# Patient Record
Sex: MALE | Race: WHITE | NOT HISPANIC OR LATINO | URBAN - METROPOLITAN AREA
[De-identification: names, ages, dates, MRNs, and addresses within clinical notes are randomized per-mention and may not be internally consistent; named-entity substitution may affect disease eponyms.]

---

## 2019-06-03 ENCOUNTER — EMERGENCY (EMERGENCY)
Facility: HOSPITAL | Age: 64
LOS: 0 days | Discharge: HOME | End: 2019-06-03
Attending: STUDENT IN AN ORGANIZED HEALTH CARE EDUCATION/TRAINING PROGRAM | Admitting: STUDENT IN AN ORGANIZED HEALTH CARE EDUCATION/TRAINING PROGRAM
Payer: COMMERCIAL

## 2019-06-03 VITALS — HEIGHT: 71 IN | WEIGHT: 205.03 LBS

## 2019-06-03 VITALS
TEMPERATURE: 99 F | HEART RATE: 69 BPM | RESPIRATION RATE: 17 BRPM | OXYGEN SATURATION: 98 % | DIASTOLIC BLOOD PRESSURE: 84 MMHG | SYSTOLIC BLOOD PRESSURE: 124 MMHG

## 2019-06-03 DIAGNOSIS — Z79.899 OTHER LONG TERM (CURRENT) DRUG THERAPY: ICD-10-CM

## 2019-06-03 DIAGNOSIS — Z88.0 ALLERGY STATUS TO PENICILLIN: ICD-10-CM

## 2019-06-03 DIAGNOSIS — I10 ESSENTIAL (PRIMARY) HYPERTENSION: ICD-10-CM

## 2019-06-03 DIAGNOSIS — K52.9 NONINFECTIVE GASTROENTERITIS AND COLITIS, UNSPECIFIED: ICD-10-CM

## 2019-06-03 DIAGNOSIS — Z79.2 LONG TERM (CURRENT) USE OF ANTIBIOTICS: ICD-10-CM

## 2019-06-03 DIAGNOSIS — R11.10 VOMITING, UNSPECIFIED: ICD-10-CM

## 2019-06-03 LAB
ALBUMIN SERPL ELPH-MCNC: 4.1 G/DL — SIGNIFICANT CHANGE UP (ref 3.3–5)
ALP SERPL-CCNC: 42 U/L — SIGNIFICANT CHANGE UP (ref 40–120)
ALT FLD-CCNC: 50 U/L — SIGNIFICANT CHANGE UP (ref 12–78)
ANION GAP SERPL CALC-SCNC: 9 MMOL/L — SIGNIFICANT CHANGE UP (ref 5–17)
AST SERPL-CCNC: 32 U/L — SIGNIFICANT CHANGE UP (ref 15–37)
BASOPHILS # BLD AUTO: 0.05 K/UL — SIGNIFICANT CHANGE UP (ref 0–0.2)
BASOPHILS NFR BLD AUTO: 0.6 % — SIGNIFICANT CHANGE UP (ref 0–2)
BILIRUB SERPL-MCNC: 0.8 MG/DL — SIGNIFICANT CHANGE UP (ref 0.2–1.2)
BUN SERPL-MCNC: 21 MG/DL — SIGNIFICANT CHANGE UP (ref 7–23)
CALCIUM SERPL-MCNC: 9.5 MG/DL — SIGNIFICANT CHANGE UP (ref 8.5–10.1)
CHLORIDE SERPL-SCNC: 104 MMOL/L — SIGNIFICANT CHANGE UP (ref 96–108)
CO2 SERPL-SCNC: 30 MMOL/L — SIGNIFICANT CHANGE UP (ref 22–31)
CREAT SERPL-MCNC: 0.88 MG/DL — SIGNIFICANT CHANGE UP (ref 0.5–1.3)
EOSINOPHIL # BLD AUTO: 0.02 K/UL — SIGNIFICANT CHANGE UP (ref 0–0.5)
EOSINOPHIL NFR BLD AUTO: 0.2 % — SIGNIFICANT CHANGE UP (ref 0–6)
GLUCOSE SERPL-MCNC: 123 MG/DL — HIGH (ref 70–99)
HCT VFR BLD CALC: 42.6 % — SIGNIFICANT CHANGE UP (ref 39–50)
HGB BLD-MCNC: 15.6 G/DL — SIGNIFICANT CHANGE UP (ref 13–17)
IMM GRANULOCYTES NFR BLD AUTO: 0.8 % — SIGNIFICANT CHANGE UP (ref 0–1.5)
LIDOCAIN IGE QN: 112 U/L — SIGNIFICANT CHANGE UP (ref 73–393)
LYMPHOCYTES # BLD AUTO: 0.45 K/UL — LOW (ref 1–3.3)
LYMPHOCYTES # BLD AUTO: 5.3 % — LOW (ref 13–44)
MAGNESIUM SERPL-MCNC: 1.8 MG/DL — SIGNIFICANT CHANGE UP (ref 1.6–2.6)
MANUAL SMEAR VERIFICATION: SIGNIFICANT CHANGE UP
MCHC RBC-ENTMCNC: 35.6 PG — HIGH (ref 27–34)
MCHC RBC-ENTMCNC: 36.6 GM/DL — HIGH (ref 32–36)
MCV RBC AUTO: 97.3 FL — SIGNIFICANT CHANGE UP (ref 80–100)
MONOCYTES # BLD AUTO: 0.49 K/UL — SIGNIFICANT CHANGE UP (ref 0–0.9)
MONOCYTES NFR BLD AUTO: 5.8 % — SIGNIFICANT CHANGE UP (ref 2–14)
NEUTROPHILS # BLD AUTO: 7.39 K/UL — SIGNIFICANT CHANGE UP (ref 1.8–7.4)
NEUTROPHILS NFR BLD AUTO: 87.3 % — HIGH (ref 43–77)
PLAT MORPH BLD: NORMAL — SIGNIFICANT CHANGE UP
PLATELET # BLD AUTO: 169 K/UL — SIGNIFICANT CHANGE UP (ref 150–400)
POTASSIUM SERPL-MCNC: 3.3 MMOL/L — LOW (ref 3.5–5.3)
POTASSIUM SERPL-SCNC: 3.3 MMOL/L — LOW (ref 3.5–5.3)
PROT SERPL-MCNC: 7.6 GM/DL — SIGNIFICANT CHANGE UP (ref 6–8.3)
RBC # BLD: 4.38 M/UL — SIGNIFICANT CHANGE UP (ref 4.2–5.8)
RBC # FLD: 12.6 % — SIGNIFICANT CHANGE UP (ref 10.3–14.5)
RBC BLD AUTO: NORMAL — SIGNIFICANT CHANGE UP
SODIUM SERPL-SCNC: 143 MMOL/L — SIGNIFICANT CHANGE UP (ref 135–145)
WBC # BLD: 8.47 K/UL — SIGNIFICANT CHANGE UP (ref 3.8–10.5)
WBC # FLD AUTO: 8.47 K/UL — SIGNIFICANT CHANGE UP (ref 3.8–10.5)

## 2019-06-03 PROCEDURE — 74177 CT ABD & PELVIS W/CONTRAST: CPT | Mod: 26

## 2019-06-03 PROCEDURE — 93010 ELECTROCARDIOGRAM REPORT: CPT

## 2019-06-03 PROCEDURE — 99285 EMERGENCY DEPT VISIT HI MDM: CPT

## 2019-06-03 PROCEDURE — 74176 CT ABD & PELVIS W/O CONTRAST: CPT | Mod: 26,59

## 2019-06-03 RX ORDER — POTASSIUM CHLORIDE 20 MEQ
10 PACKET (EA) ORAL
Refills: 0 | Status: COMPLETED | OUTPATIENT
Start: 2019-06-03 | End: 2019-06-03

## 2019-06-03 RX ORDER — SODIUM CHLORIDE 9 MG/ML
1000 INJECTION INTRAMUSCULAR; INTRAVENOUS; SUBCUTANEOUS ONCE
Refills: 0 | Status: COMPLETED | OUTPATIENT
Start: 2019-06-03 | End: 2019-06-03

## 2019-06-03 RX ORDER — FAMOTIDINE 10 MG/ML
20 INJECTION INTRAVENOUS ONCE
Refills: 0 | Status: COMPLETED | OUTPATIENT
Start: 2019-06-03 | End: 2019-06-03

## 2019-06-03 RX ORDER — POTASSIUM CHLORIDE 20 MEQ
40 PACKET (EA) ORAL ONCE
Refills: 0 | Status: DISCONTINUED | OUTPATIENT
Start: 2019-06-03 | End: 2019-06-03

## 2019-06-03 RX ORDER — ONDANSETRON 8 MG/1
4 TABLET, FILM COATED ORAL ONCE
Refills: 0 | Status: COMPLETED | OUTPATIENT
Start: 2019-06-03 | End: 2019-06-03

## 2019-06-03 RX ADMIN — SODIUM CHLORIDE 1000 MILLILITER(S): 9 INJECTION INTRAMUSCULAR; INTRAVENOUS; SUBCUTANEOUS at 09:51

## 2019-06-03 RX ADMIN — SODIUM CHLORIDE 1000 MILLILITER(S): 9 INJECTION INTRAMUSCULAR; INTRAVENOUS; SUBCUTANEOUS at 13:11

## 2019-06-03 RX ADMIN — FAMOTIDINE 20 MILLIGRAM(S): 10 INJECTION INTRAVENOUS at 10:38

## 2019-06-03 RX ADMIN — Medication 100 MILLIEQUIVALENT(S): at 14:59

## 2019-06-03 RX ADMIN — ONDANSETRON 4 MILLIGRAM(S): 8 TABLET, FILM COATED ORAL at 09:52

## 2019-06-03 RX ADMIN — SODIUM CHLORIDE 1000 MILLILITER(S): 9 INJECTION INTRAMUSCULAR; INTRAVENOUS; SUBCUTANEOUS at 10:51

## 2019-06-03 RX ADMIN — SODIUM CHLORIDE 1000 MILLILITER(S): 9 INJECTION INTRAMUSCULAR; INTRAVENOUS; SUBCUTANEOUS at 14:11

## 2019-06-03 RX ADMIN — Medication 100 MILLIEQUIVALENT(S): at 16:44

## 2019-06-03 RX ADMIN — Medication 10 MILLIEQUIVALENT(S): at 14:09

## 2019-06-03 RX ADMIN — ONDANSETRON 4 MILLIGRAM(S): 8 TABLET, FILM COATED ORAL at 11:02

## 2019-06-03 RX ADMIN — Medication 20 MILLIGRAM(S): at 10:43

## 2019-06-03 RX ADMIN — Medication 10 MILLIEQUIVALENT(S): at 15:59

## 2019-06-03 RX ADMIN — Medication 100 MILLIEQUIVALENT(S): at 13:09

## 2019-06-03 RX ADMIN — Medication 10 MILLIEQUIVALENT(S): at 17:45

## 2019-06-03 NOTE — CONSULT NOTE ADULT - ASSESSMENT
64 y/o M presents with nausea and vomiting with possible SBO.     reviewed imaging and physical exam much improved since admission   recommends repeat CT scan with PO contrast   If improved DC home with follow up     Thank you for the consult     Discussed with Dr Ackerman

## 2019-06-03 NOTE — ED PROVIDER NOTE - OBJECTIVE STATEMENT
64 y/o male with a PMHx of appendectomy, 2 hernias presents c/o vomiting and dizziness. Pt states he ate out last night and he awoke today at 4:35AM vomiting.Pt reports about 10 episodes of vomiting.Pt notes the abdominal pain came first and then vomiting. Pt reports tingling in the chest and CP.Deies blood in vomit and back pain  amoxycillin and BP medication  Pt states pain in abdomen has been resolved.   Pt has had gingerale.   normal 62 y/o male with a PMHx of appendectomy, 2 hernias presents c/o vomiting and dizziness. Pt states he ate out last night and he awoke today at 4:35AM vomiting. Pt reports about 10 episodes of vomiting. Pt notes the abdominal pain came first in waxing episodes and then the vomiting. Pt reports tingling in the chest and CP. Denies blood in vomit and back pain. Pt drank gingerale and states he has no abdominal pain at this time. Pt is currently taking amoxicillin and BP medication. 62 y/o male with a PMHx of appendectomy, 2 hernias presents c/o vomiting and dizziness. Pt states he ate out last night and he awoke today at 4:35AM vomiting. Pt reports about 10 episodes of vomiting. Pt notes the abdominal pain came first in waxing episodes and then the vomiting. Denies blood in vomit and back pain. Pt drank gingerale and states he has no abdominal pain at this time. Pt is currently taking amoxicillin and BP medication.

## 2019-06-03 NOTE — ED ADULT TRIAGE NOTE - CHIEF COMPLAINT QUOTE
pt presents to ED c/o dizziness/nausea. pt states he ate out last night and believes he may have food poisoning, awoke today at 5 AM vomiting

## 2019-06-03 NOTE — ED PROVIDER NOTE - PROGRESS NOTE DETAILS
Any Nash for ED Attending Dr. Marinelli: Spoke with Dr. Null (pt surgeon) reports that he is not available today requests Dr. Ryder surgery on call be call spoken to. Surgery resident will come to ED to see pt. Any Nash for ED Attending Dr. Marinelli: Spoke with Dr. Null (pt surgeon) reports that he is not available today requests Dr. Zhong surgery on call be contacted; Surgery resident will come to ED to see pt. Isis DO: Per surgery team- would like patient re-scanned with PO contrast- test ordered as requested; patient made aware. Isis DO: S/o to Dr. Slater pending repeat CT scan at this time.

## 2019-06-03 NOTE — ED PROVIDER NOTE - CLINICAL SUMMARY MEDICAL DECISION MAKING FREE TEXT BOX
64 y/o male presents to ED c/o vomiting. Pt has no abdominal pain at this time. Plan is labs, fluids, and re-evaluate.

## 2022-09-23 NOTE — ED ADULT NURSE NOTE - GASTROINTESTINAL ASSESSMENT
X-Ray of L hand performed at [facility] revealed small, non-displaced fx of L 5th metacarpal bone. Pt currently s/p finger and wrist splint. Plan is to continue with RICE protocol, avoid NSAIDs i/s/o gastrectomy and possible albeit low suspicion for GI bleed. - - -

## 2024-04-25 NOTE — CONSULT NOTE ADULT - SUBJECTIVE AND OBJECTIVE BOX
HPI: 62 y/o M present with 1 day history of nausea and vomiting. Patient reports 1 small BM this am and passing flatus yesterday. patient reports he stomach became upset and progressively had several episode of nonbloody nonbilious emesis. Patients colonscopy 5 years ago with out abnormal pathology. patient denies recent travel, fever, chills, SOB and chest pain.     PMH: HTN   PSH: tooth extraction (5/29), right inguinal hernia repair, appendectomy   Allx: PCN     Vitals   Vital Signs Last 24 Hrs  T(C): 37 (03 Jun 2019 12:25), Max: 37 (03 Jun 2019 12:25)  T(F): 98.6 (03 Jun 2019 12:25), Max: 98.6 (03 Jun 2019 12:25)  HR: 69 (03 Jun 2019 12:25) (69 - 80)  BP: 120/73 (03 Jun 2019 12:25) (120/73 - 125/91)  BP(mean): --  RR: 19 (03 Jun 2019 12:25) (19 - 19)  SpO2: 100% (03 Jun 2019 12:25) (100% - 100%)    Gen: NAD, cooperative   HEENT: supple, no JVD, EOMI  Lungs: CTA b/l, aerating well   CV: S1 and S2 present  Abd: BS present, NT,ND, no RGR   Ext: FROM, pulse present, no cyanosis, No edema   Skin: warm, dry     Labs:                         15.6   8.47  )-----------( 169      ( 03 Jun 2019 09:48 )             42.6   06-03    143  |  104  |  21  ----------------------------<  123<H>  3.3<L>   |  30  |  0.88    Ca    9.5      03 Jun 2019 09:48  Mg     1.8     06-03    TPro  7.6  /  Alb  4.1  /  TBili  0.8  /  DBili  x   /  AST  32  /  ALT  50  /  AlkPhos  42  06-03      Imaging:   PROCEDURE:   CT of the Abdomen and Pelvis was performed with intravenous contrast.   Intravenous contrast: 90 ml Omnipaque 350. 10 ml discarded.  Oral contrast: Not administered.  Sagittal and coronal reformats were performed.    FINDINGS:    LOWER CHEST: Visualized lung bases, heart and pericardium are   unremarkable.    LIVER: Steatosis. No focal lesion.  SPLEEN: Within normal limits.  PANCREAS: Within normal limits.  GALLBLADDER: Within normal limits.  BILE DUCTS: Normal caliber.  ADRENALS: Within normal limits.  KIDNEYS/URETERS: No mass, stone or hydronephrosis.    RETROPERITONEUM: No lymphadenopathy.    VESSELS:  Within normal limits    BOWEL: Mildly dilated and fluid-filled small bowel loops with   decompressed distal ileum and transition point in the right lower   quadrant. No wall thickening, pneumatosis or closed-loop configuration.   Appendix not identified. Colonic diverticulosis without diverticulitis.  PERITONEUM: No ascites or pneumoperitoneum.    REPRODUCTIVE ORGANS: The prostate and seminal vesicles are within normal   limits.  BLADDER: Within normal limits.    ABDOMINAL WALL: Within normal limits.  MUSCULOSKELETAL: No acute bony abnormality.    IMPRESSION:   Mildly dilated small bowel loops compatible with small bowel obstruction,   with transition point in the distal ileum. 25